# Patient Record
(demographics unavailable — no encounter records)

---

## 2024-11-14 NOTE — IMAGING
[de-identified] : On examination of the right shoulder no obvious swelling, no ecchymosis, no erythema.  Skin is intact.  Tenderness to the proximal humerus.  No tenderness of the clavicle or AC joint.  He is unable to actively forward flex or abduct, passively we can range him to approximately 100 degrees, he is unable to resist against rotator cuff testing.  Good range of motion of the elbow wrist and fingers neurovascularly intact. X-ray reviewed on disc from urgent care right shoulder 2 cc into the greater tuberosity most likely consistent with nondisplaced greater tuberosity fracture

## 2024-11-14 NOTE — HISTORY OF PRESENT ILLNESS
[de-identified] : 25-year-old male comes in today for evaluation of his right shoulder pain and injury that occurred on November 12.  Patient tripped over a hose at work he works as .  Right-hand-dominant.  He fell and put his arm out.  Went to urgent care had an x-ray done was told he had a fracture.  Was given a sling.  Taking ibuprofen.

## 2024-11-14 NOTE — ASSESSMENT
[FreeTextEntry1] : Patient was placed into a better fitting sling.  Gentle range of motion the elbow wrist and fingers so he does not become stiff.  No overhead lifting pushing pulling.  He has total temporary disability not currently working.  Continue with ibuprofen 800 that he has from urgent care.  Will put in a request for MRI of shoulder, follow-up in a few weeks for repeat evaluation.

## 2024-12-02 NOTE — HISTORY OF PRESENT ILLNESS
[de-identified] : Patient is here for follow-up after a fall at work his right shoulder MRI came back with nondisplaced greater tuberosity fracture  He is nontender with palpation of the fracture site guarded range of motion  X-rays taken today show some osteolysis at the fracture site nondisplaced  I recommend he DC the sling start therapy in 1 week I will see him back in 4 weeks to evaluate him sleeping and back to work activities wife was present during exam and discussion, patient is currently unable to work 100% temporary disabled

## 2024-12-30 NOTE — HISTORY OF PRESENT ILLNESS
[de-identified] : Patient is here for follow-up on his right shoulder and MRI with a greater tuberosity fracture  Physical exam is got good strength full range of motion  We talked about a treatment plan has been to continue on home strengthening exercises wants to get back to full duty I will see him back in about 6 weeks for his last visit

## 2025-02-19 NOTE — HISTORY OF PRESENT ILLNESS
[de-identified] : The patient is a 26-year-old male here for a subsequent reevaluation of his right shoulder.  He was injured at work on 11/12/2024.  He has an MRI that showed a nondisplaced right greater tuberosity fracture.  He still has some pain in his shoulder with overhead movements.  He is working full duty.  He works as a .  He was doing formal physical therapy, the sessions have worn out.  He reports increased range of motion with the therapy.

## 2025-02-19 NOTE — IMAGING
[de-identified] : Physical exam of the right shoulder: Active range of motion forward flexion 150 degrees, passive range of motion forward flexion 175 degrees.  Active range of motion abduction 100 degrees, active motion internal rotation to L4.  Mild tenderness to palpation over the proximal humerus.  Intact to light touch.

## 2025-02-19 NOTE — DISCUSSION/SUMMARY
[de-identified] : I reviewed the x-ray findings with the patient.  Please send authorization for formal physical therapy for the right shoulder, especially for range of motion.  I did explain to him that he may lose some range of motion with this type of fracture.  He is working full duty, mild degree of disability.  I will see him in 6 weeks for further evaluation.

## 2025-02-19 NOTE — DATA REVIEWED
[Right] : of the right [Shoulder] : shoulder [FreeTextEntry1] : 3 views of the right shoulder were obtained in the office today and show: A nondisplaced greater tuberosity fracture callus formation around the fracture site in acceptable alignment.

## 2025-03-31 NOTE — IMAGING
[de-identified] : Physical exam of the right shoulder: Active range of motion forward flexion 165 degrees, passive range of motion forward flexion 180 degrees with pain.  Active range of motion abduction 120 degrees, active motion internal rotation to L3.  Some weakness for rotator cuff resistance testing.  Intact to light touch.

## 2025-03-31 NOTE — DISCUSSION/SUMMARY
English [de-identified] : At this point I recommend he continues with formal physical therapy to finish the remaining formal physical therapy sessions and convert to a home program.  I will see him in 6 weeks for further evaluation.  He is working full duty, mild degree of disability.

## 2025-03-31 NOTE — DATA REVIEWED
[Right] : of the right [Shoulder] : shoulder [FreeTextEntry1] : 3 views of the right shoulder were obtained here in office today and show: Fracture in acceptable alignment almost completely healed.

## 2025-03-31 NOTE — HISTORY OF PRESENT ILLNESS
[de-identified] : The patient is a 26-year-old male here for a subsequent reevaluation of his right shoulder.  He was injured at work on 11/12/2024.  He has an MRI that showed a nondisplaced right greater tuberosity fracture.  His shoulder is improving.  He is doing formal physical therapy.  He does get some pinching pain in the shoulder.  He is working full duty.  He works as an .  He is starting a new position for the [employer] is an  next week.

## 2025-06-18 NOTE — HISTORY OF PRESENT ILLNESS
[de-identified] : The patient is a 26-year-old male here for a subsequent reevaluation of his right shoulder.  He is accompanied by his spouse today.  He was injured at work on 11/12/2024.  He has an MRI that showed a nondisplaced right greater tuberosity fracture.  At his last office visit his shoulder was doing well however recently he went to catch a ball thrown by his daughter and developed significant pain in the shoulder.  Formal physical therapy had finished up and was helping him.

## 2025-06-18 NOTE — DATA REVIEWED
[Right] : of the right [Shoulder] : shoulder [FreeTextEntry1] : 3 views of the right shoulder were obtained here in office today and show: A completely healed greater tuberosity fracture in acceptable alignment.

## 2025-06-18 NOTE — IMAGING
[de-identified] : Physical exam of the right shoulder: Active range of motion forward flexion 170 degrees, passive range of motion forward flexion 180 degrees with pain.  Active range of motion abduction 165 degrees, active motion internal rotation to L3.  Good strength but some discomfort with rotator cuff resistance testing.  Positive [facility] testing.  Intact to light touch.

## 2025-06-18 NOTE — DISCUSSION/SUMMARY
[de-identified] : I reviewed the x-ray findings with the patient.  At this point I recommend he returns to formal physical therapy.  Therapy has been requested to help restore full range of motion and strength of the shoulder.  He is working full duty, mild degree of disability.  I will see him back in 2 months for further evaluation.